# Patient Record
Sex: MALE | Race: OTHER | NOT HISPANIC OR LATINO | URBAN - METROPOLITAN AREA
[De-identification: names, ages, dates, MRNs, and addresses within clinical notes are randomized per-mention and may not be internally consistent; named-entity substitution may affect disease eponyms.]

---

## 2018-11-01 ENCOUNTER — OUTPATIENT (OUTPATIENT)
Dept: OUTPATIENT SERVICES | Facility: HOSPITAL | Age: 68
LOS: 1 days | End: 2018-11-01
Payer: MEDICARE

## 2018-11-01 PROCEDURE — G9001: CPT

## 2018-11-10 PROBLEM — Z00.00 ENCOUNTER FOR PREVENTIVE HEALTH EXAMINATION: Status: ACTIVE | Noted: 2018-11-10

## 2018-11-19 ENCOUNTER — TRANSCRIPTION ENCOUNTER (OUTPATIENT)
Age: 68
End: 2018-11-19

## 2018-11-19 NOTE — ASU PATIENT PROFILE, ADULT - PMH
Anemia    Diverticulitis    DM (diabetes mellitus)  type 11  HTN (hypertension)    Hyperlipidemia    Obesity    Osteoarthritis    Vitamin D deficiency

## 2018-11-19 NOTE — ASU PATIENT PROFILE, ADULT - VISION (WITH CORRECTIVE LENSES IF THE PATIENT USUALLY WEARS THEM):
left eye blurry/Partially impaired: cannot see medication labels or newsprint, but can see obstacles in path, and the surrounding layout; can count fingers at arm's length

## 2018-11-20 ENCOUNTER — OUTPATIENT (OUTPATIENT)
Dept: OUTPATIENT SERVICES | Facility: HOSPITAL | Age: 68
LOS: 1 days | End: 2018-11-20
Payer: MEDICARE

## 2018-11-20 VITALS
OXYGEN SATURATION: 98 % | RESPIRATION RATE: 22 BRPM | HEART RATE: 81 BPM | TEMPERATURE: 99 F | WEIGHT: 207.23 LBS | HEIGHT: 65.5 IN

## 2018-11-20 VITALS
DIASTOLIC BLOOD PRESSURE: 71 MMHG | OXYGEN SATURATION: 98 % | RESPIRATION RATE: 23 BRPM | HEART RATE: 80 BPM | SYSTOLIC BLOOD PRESSURE: 134 MMHG

## 2018-11-20 DIAGNOSIS — Z98.890 OTHER SPECIFIED POSTPROCEDURAL STATES: Chronic | ICD-10-CM

## 2018-11-20 DIAGNOSIS — H25.812 COMBINED FORMS OF AGE-RELATED CATARACT, LEFT EYE: ICD-10-CM

## 2018-11-20 LAB — GLUCOSE BLDC GLUCOMTR-MCNC: 123 MG/DL — HIGH (ref 70–99)

## 2018-11-20 PROCEDURE — 66984 XCAPSL CTRC RMVL W/O ECP: CPT | Mod: LT

## 2018-11-20 PROCEDURE — V2632: CPT

## 2018-11-20 PROCEDURE — 82962 GLUCOSE BLOOD TEST: CPT

## 2018-11-23 ENCOUNTER — EMERGENCY (EMERGENCY)
Facility: HOSPITAL | Age: 68
LOS: 1 days | Discharge: HOME | End: 2018-11-23
Attending: EMERGENCY MEDICINE | Admitting: EMERGENCY MEDICINE
Payer: MEDICARE

## 2018-11-23 VITALS
OXYGEN SATURATION: 96 % | DIASTOLIC BLOOD PRESSURE: 69 MMHG | HEART RATE: 98 BPM | WEIGHT: 205.03 LBS | HEIGHT: 63 IN | TEMPERATURE: 98 F | SYSTOLIC BLOOD PRESSURE: 148 MMHG | RESPIRATION RATE: 18 BRPM

## 2018-11-23 VITALS
SYSTOLIC BLOOD PRESSURE: 132 MMHG | OXYGEN SATURATION: 97 % | TEMPERATURE: 98 F | DIASTOLIC BLOOD PRESSURE: 68 MMHG | HEART RATE: 88 BPM | RESPIRATION RATE: 14 BRPM

## 2018-11-23 DIAGNOSIS — Z98.890 OTHER SPECIFIED POSTPROCEDURAL STATES: Chronic | ICD-10-CM

## 2018-11-23 PROBLEM — K57.92 DIVERTICULITIS OF INTESTINE, PART UNSPECIFIED, WITHOUT PERFORATION OR ABSCESS WITHOUT BLEEDING: Chronic | Status: ACTIVE | Noted: 2018-11-20

## 2018-11-23 PROBLEM — I10 ESSENTIAL (PRIMARY) HYPERTENSION: Chronic | Status: ACTIVE | Noted: 2018-11-20

## 2018-11-23 PROBLEM — M19.90 UNSPECIFIED OSTEOARTHRITIS, UNSPECIFIED SITE: Chronic | Status: ACTIVE | Noted: 2018-11-20

## 2018-11-23 PROBLEM — E78.5 HYPERLIPIDEMIA, UNSPECIFIED: Chronic | Status: ACTIVE | Noted: 2018-11-20

## 2018-11-23 PROBLEM — E55.9 VITAMIN D DEFICIENCY, UNSPECIFIED: Chronic | Status: ACTIVE | Noted: 2018-11-20

## 2018-11-23 PROBLEM — D64.9 ANEMIA, UNSPECIFIED: Chronic | Status: ACTIVE | Noted: 2018-11-20

## 2018-11-23 PROBLEM — E11.9 TYPE 2 DIABETES MELLITUS WITHOUT COMPLICATIONS: Chronic | Status: ACTIVE | Noted: 2018-11-20

## 2018-11-23 PROBLEM — E66.9 OBESITY, UNSPECIFIED: Chronic | Status: ACTIVE | Noted: 2018-11-20

## 2018-11-23 PROCEDURE — 99284 EMERGENCY DEPT VISIT MOD MDM: CPT

## 2018-11-23 RX ORDER — INSULIN LISPRO 100 [IU]/ML
100 INJECTION, SUSPENSION SUBCUTANEOUS
Qty: 0 | Refills: 0 | COMMUNITY

## 2018-11-23 RX ORDER — OFLOXACIN 0.3 %
1 DROPS OPHTHALMIC (EYE)
Qty: 0 | Refills: 0 | COMMUNITY

## 2018-11-23 RX ORDER — LOSARTAN POTASSIUM 100 MG/1
1 TABLET, FILM COATED ORAL
Qty: 0 | Refills: 0 | COMMUNITY

## 2018-11-23 RX ORDER — POLYMYXIN B SULF/TRIMETHOPRIM 10000-1/ML
1 DROPS OPHTHALMIC (EYE) ONCE
Qty: 0 | Refills: 0 | Status: COMPLETED | OUTPATIENT
Start: 2018-11-23 | End: 2018-11-23

## 2018-11-23 RX ORDER — DIPHENHYDRAMINE HCL 50 MG
25 CAPSULE ORAL EVERY 6 HOURS
Qty: 0 | Refills: 0 | Status: DISCONTINUED | OUTPATIENT
Start: 2018-11-23 | End: 2018-11-27

## 2018-11-23 RX ORDER — BROMFENAC 0.76 MG/ML
1 SOLUTION/ DROPS OPHTHALMIC
Qty: 0 | Refills: 0 | COMMUNITY

## 2018-11-23 RX ORDER — LINAGLIPTIN AND METFORMIN HYDROCHLORIDE 2.5; 85 MG/1; MG/1
1 TABLET, FILM COATED ORAL
Qty: 0 | Refills: 0 | COMMUNITY

## 2018-11-23 RX ORDER — SIMVASTATIN 20 MG/1
1 TABLET, FILM COATED ORAL
Qty: 0 | Refills: 0 | COMMUNITY

## 2018-11-23 RX ORDER — TROPICAMIDE 1 %
1 DROPS OPHTHALMIC (EYE)
Qty: 0 | Refills: 0 | COMMUNITY

## 2018-11-23 RX ORDER — PREDNISOLONE SODIUM PHOSPHATE 1 %
1 DROPS OPHTHALMIC (EYE)
Qty: 0 | Refills: 0 | COMMUNITY

## 2018-11-23 RX ADMIN — Medication 25 MILLIGRAM(S): at 11:35

## 2018-11-23 RX ADMIN — Medication 1 DROP(S): at 12:00

## 2018-11-23 NOTE — ED PROVIDER NOTE - PROGRESS NOTE DETAILS
Seen by Opth resident who discussed with Ophth attending. DC with Polytrim and prednisolone only. FU office on Monday.

## 2018-11-23 NOTE — ED ADULT NURSE NOTE - CHIEF COMPLAINT QUOTE
" Scratchy, swelling and redness to left eye since yesterday, s/p left cataract surgey by Dr. Larson here at [place] on Tuesday, right eye 20/100, left eye 20/40 "

## 2018-11-23 NOTE — ED PROVIDER NOTE - CHPI ED SYMPTOMS NEG
no foreign body/no blurred vision/no drainage/no double vision/no discharge/no photophobia/no purulent drainage/no pain

## 2018-11-23 NOTE — ED ADULT TRIAGE NOTE - CHIEF COMPLAINT QUOTE
" Scratchy, swelling and redness to left eye since yesterday, s/p left cataract surgey by Dr. Larson here at [place] on Tuesday " " Scratchy, swelling and redness to left eye since yesterday, s/p left cataract surgey by Dr. Larson here at [place] on Tuesday, right eye 20/100, left eye 20/40 "

## 2018-11-23 NOTE — ED PROVIDER NOTE - OBJECTIVE STATEMENT
67 yo male with hx of left cataract surgery in [facility] on 11/20 Dr Garcia. Seen in office for follow up 11/21 and doing well. Today developed redness swelling and itching of left eye. Has been using multiple eye drops since day after surgery. Denies fever, Pain, vision loss or other symptom. Tried to call ophth office today but got no reply.

## 2018-11-23 NOTE — ED PROVIDER NOTE - MEDICAL DECISION MAKING DETAILS
69 yo male sp cataract surgery with probable allergic reaction to one of eye drops he has been using. Plan - ophth consult.

## 2018-11-23 NOTE — ED PROVIDER NOTE - EYE, LEFT
CHEMOSIS/Periorbital redness and swelling, ?allergic reaction/pupils equal, round, and reactive to light/CONJUNCTIVA ERYTHEMA

## 2018-11-27 DIAGNOSIS — Z71.89 OTHER SPECIFIED COUNSELING: ICD-10-CM

## 2020-02-20 DIAGNOSIS — G89.29 PAIN IN RIGHT KNEE: ICD-10-CM

## 2020-02-20 DIAGNOSIS — M25.561 PAIN IN RIGHT KNEE: ICD-10-CM

## 2020-02-24 ENCOUNTER — APPOINTMENT (OUTPATIENT)
Dept: ORTHOPEDIC SURGERY | Facility: CLINIC | Age: 70
End: 2020-02-24
Payer: MEDICARE

## 2020-02-24 VITALS
BODY MASS INDEX: 35.32 KG/M2 | WEIGHT: 212 LBS | HEIGHT: 65 IN | HEART RATE: 76 BPM | DIASTOLIC BLOOD PRESSURE: 73 MMHG | SYSTOLIC BLOOD PRESSURE: 146 MMHG

## 2020-02-24 DIAGNOSIS — Z72.89 OTHER PROBLEMS RELATED TO LIFESTYLE: ICD-10-CM

## 2020-02-24 DIAGNOSIS — Z78.9 OTHER SPECIFIED HEALTH STATUS: ICD-10-CM

## 2020-02-24 DIAGNOSIS — Z87.39 PERSONAL HISTORY OF OTHER DISEASES OF THE MUSCULOSKELETAL SYSTEM AND CONNECTIVE TISSUE: ICD-10-CM

## 2020-02-24 PROCEDURE — 73564 X-RAY EXAM KNEE 4 OR MORE: CPT | Mod: 50

## 2020-02-24 PROCEDURE — 99204 OFFICE O/P NEW MOD 45 MIN: CPT

## 2020-02-24 RX ORDER — LOSARTAN POTASSIUM 100 MG/1
TABLET, FILM COATED ORAL
Refills: 0 | Status: ACTIVE | COMMUNITY

## 2020-02-24 RX ORDER — SIMVASTATIN 80 MG/1
TABLET, FILM COATED ORAL
Refills: 0 | Status: ACTIVE | COMMUNITY

## 2020-02-24 NOTE — REASON FOR VISIT
[Initial Visit] : an initial visit for [Osteoarthritis, Knee] : osteoarthritis, knee [Family Member] : family member

## 2020-02-24 NOTE — DISCUSSION/SUMMARY
[de-identified] : This patient has severe bilateral knee osteoarthritis.  Given that the right knee is more painful he would like to start with a right total knee arthroplasty having failed a course of conservative management and he would like to do this with robotic navigation for assistance.  After allowing a minimum of 3 months for healing he would then like to proceed with a staged left total knee arthroplasty.\par \par The patient is an appropriate candidate for consideration of right total knee replacement. An extensive discussion was conducted of the natural history of the disease and the variety of surgical and non-surgical treatment options available to the patient. A risk/benefit analysis was discussed with the patient reviewing the advantages and disadvantages of surgical intervention at this time. Both the level and length of the patient's pain have made additional conservative treatment measures consisting of physical therapy, corticosteroids, and/or viscosupplementation contraindicated. A full explanation was given of the nature and the purpose of the procedure and anesthesia, its benefits, possible alternative methods of diagnosis or treatment, the risks involved, the possibility of complications, the foreseeable consequences of the procedure and the possible results of the non-treatment. I reviewed the plan of care as well as used a model of a total knee implant equivalent to the one that will be used for their total knee joint replacement. The ability to secure the implant utilizing cement or cementless (press-fit) was discussed with the patient. The patient agrees with the plan of care, as well as the use of implants for their total knee replacement.   We also discussed that if robotic/computer navigation is utilized, then additional incisions will be need to be made to accommodate the computer navigation arrays, which will be placed in the femur and tibia.\par \par No guarantee or assurance was made as to the results that may be obtained. Specifically, the risks were identified to include, but are not limited to the following: Infection, phlebitis, pulmonary embolism, death, paralysis, dislocation, pain, stiffness, instability, limp, weakness, breakage, leg-length inequality, uncontrolled bleeding, nerve injury, blood vessel injury, pressure sores, anesthetic risks, delayed healing of wound and bone, and wear and loosening. Further discussion was undertaken with the patient about the details of surgical preparation, treatment, and postoperative rehabilitation including medical clearance, autotransfusion, the hospital course, and the postoperative rehabilitation involved. All in all, I feel that this patient is a good candidate for surgical reconstruction.

## 2020-02-24 NOTE — PHYSICAL EXAM
[de-identified] : Patient is well nourished, well-developed, in no acute distress, with appropriate mood and affect. The patient is oriented to time, place, and person. Respirations are even and unlabored. Gait evaluation does reveal a limp. There is no inguinal adenopathy. Bilateral limbs are well-perfused, without skin lesions, shows a grossly normal motor and sensory examination. The right knee motion is significantly reduced and does cause significant pain. The right knee moves from 0-125 degrees. The knee is stable within that range-of-motion to AP and ML stress. The alignment of the knee is 10 degrees varus. Muscle strength is normal. Pedal pulses are palpable. Hip examination was negative. The left knee motion is significantly reduced and does cause significant pain. The left knee moves from 0-125 degrees. The knee is stable within that range-of-motion to AP and ML stress. The alignment of the knee is 10 degrees varus. Muscle strength is normal. Pedal pulses are palpable. Hip examination was negative. [de-identified] : Long standing knee, AP knee, lateral knee, and patellar views of the bilateral knee were ordered and taken in the office and demonstrate severe degenerative joint disease of the knee with joint space narrowing, osteophyte formation, and subchondral sclerosis.

## 2020-02-24 NOTE — HISTORY OF PRESENT ILLNESS
[de-identified] : This is very nice 69-year-old gentleman experiencing bilateral knee pain, which is severe in intensity. The pain substantially limits activities of daily living. Walking tolerance is reduced. Medication including NSAIDs and activity modification have been minimally effective for a period lasting greater than three months in duration. Assistive devices and external support were not deemed by the patient to be helpful in improving their function.  He is already tried a course of physical therapy in the past which has helped but due to the severity of osteoarthritis and level of pain, physical therapy is contraindicated. Pain and restriction of function are intolerable at this time.  The patient denies any radiation of the pain to the feet and it is not associated with numbness, tingling, or weakness.

## 2020-02-26 ENCOUNTER — APPOINTMENT (OUTPATIENT)
Dept: ORTHOPEDIC SURGERY | Facility: CLINIC | Age: 70
End: 2020-02-26

## 2020-03-24 ENCOUNTER — APPOINTMENT (OUTPATIENT)
Dept: CT IMAGING | Facility: CLINIC | Age: 70
End: 2020-03-24

## 2020-04-03 ENCOUNTER — APPOINTMENT (OUTPATIENT)
Dept: ORTHOPEDIC SURGERY | Facility: HOSPITAL | Age: 70
End: 2020-04-03

## 2020-12-23 ENCOUNTER — FORM ENCOUNTER (OUTPATIENT)
Age: 70
End: 2020-12-23

## 2020-12-24 ENCOUNTER — TRANSCRIPTION ENCOUNTER (OUTPATIENT)
Age: 70
End: 2020-12-24

## 2020-12-28 ENCOUNTER — APPOINTMENT (OUTPATIENT)
Dept: DISASTER EMERGENCY | Facility: HOSPITAL | Age: 70
End: 2020-12-28

## 2020-12-29 ENCOUNTER — OUTPATIENT (OUTPATIENT)
Dept: OUTPATIENT SERVICES | Facility: HOSPITAL | Age: 70
LOS: 1 days | End: 2020-12-29
Payer: MEDICARE

## 2020-12-29 ENCOUNTER — APPOINTMENT (OUTPATIENT)
Dept: DISASTER EMERGENCY | Facility: HOSPITAL | Age: 70
End: 2020-12-29

## 2020-12-29 VITALS
DIASTOLIC BLOOD PRESSURE: 83 MMHG | OXYGEN SATURATION: 97 % | SYSTOLIC BLOOD PRESSURE: 148 MMHG | RESPIRATION RATE: 18 BRPM | HEART RATE: 82 BPM | HEIGHT: 73 IN | TEMPERATURE: 98 F | WEIGHT: 195.11 LBS

## 2020-12-29 VITALS
OXYGEN SATURATION: 96 % | TEMPERATURE: 99 F | SYSTOLIC BLOOD PRESSURE: 133 MMHG | DIASTOLIC BLOOD PRESSURE: 84 MMHG | RESPIRATION RATE: 18 BRPM | HEART RATE: 81 BPM

## 2020-12-29 DIAGNOSIS — U07.1 COVID-19: ICD-10-CM

## 2020-12-29 DIAGNOSIS — Z98.890 OTHER SPECIFIED POSTPROCEDURAL STATES: Chronic | ICD-10-CM

## 2020-12-29 PROCEDURE — M0239: CPT

## 2020-12-29 RX ORDER — BAMLANIVIMAB 35 MG/ML
700 INJECTION, SOLUTION INTRAVENOUS ONCE
Refills: 0 | Status: COMPLETED | OUTPATIENT
Start: 2020-12-29 | End: 2020-12-29

## 2020-12-29 RX ADMIN — BAMLANIVIMAB 200 MILLIGRAM(S): 35 INJECTION, SOLUTION INTRAVENOUS at 09:30

## 2020-12-29 NOTE — MONOCLONAL ANTIBODY INFUSION - ASSESSMENT AND PLAN
PMHx:  PSHx:    CC: Monoclonal Antibody Infusion/COVID 19 Positive  70yMale    Patient is covid + and presents today for monoclonoal antibody infusion.  Pt met criteria for the infusion and is medically cleared for infusion today.      exam/findings:  T(C): --  HR: --  BP: --  RR: --  SpO2: --      PE:   Appearance: NAD	  HEENT:   No Lymphadenopathy  Cardiovascular: RRR  Respiratory: CTA B/L  Gastrointestinal:  Soft, Non-tender  Skin: warm and dry, no rash, no lesions  Extremities: Neg edema    ASSESSMENT:  Patient is a 71yo with a past medical history of ---- found with + Covid PCR( date)  referred by who presents to infusion center for Monoclonal antibody infusion (Bamlanivimab)    Risk Profile includes:     PLAN:  - Infusion procedure explained to patient   - Consent for monoclonal antibody infusion obtained and placed in patient's bedside chart  - Risk and benefits discussed with the patient and all questions were answered  - Infuse Bamlanivimab 700mg IV over one hour  - Check vital signs immediately prior to infusion start and then after 15 minutes, 30 minutes, and at completion of infusion.   - Monitor the patient for one hour post infusion and then check vitals again prior to discharge.    Roma Yeager PA-C    Addendum @    Vitals:    Patient is cleared for discharge. He/she tolerated full infusion well and denies complaints of chest pain, shortness of breath, nausea/vomiting, dizziness, or palpitations. Vital signs stable upon discharge. Patient is medically stable and cleared for discharge home. Discharge instructions provided to patient with fact sheet included. Patient was instructed to continue to self-isolate and use  infection control measures (e.g., wear mask, isolate, social distance, avoid sharing personal items, clean and disinfect “high touch” surfaces, and frequent handwashing) according to CDC guidelines. Patient instructed to follow up with PMD as needed.    Roma Yeager PA-C       PMHx:  PSHx:    CC: Monoclonal Antibody Infusion/COVID 19 Positive  70yMale    Patient is covid + and presents today for monoclonoal antibody infusion.  Pt met criteria for the infusion and is medically cleared for infusion today.      exam/findings:  T(C): --  HR: --  BP: --  RR: --  SpO2: --      PE:   Appearance: NAD	  HEENT:   No Lymphadenopathy  Cardiovascular: RRR  Respiratory: CTA B/L  Gastrointestinal:  Soft, Non-tender  Skin: warm and dry, no rash, no lesions  Extremities: Neg edema    ASSESSMENT:  Patient is a 69yo with a past medical history of ---- found with + Covid PCR(12/21)  referred by Dr. Pierce who presents to infusion center for Monoclonal antibody infusion (Bamlanivimab)    Risk Profile includes:     PLAN:  - Infusion procedure explained to patient   - Consent for monoclonal antibody infusion obtained and placed in patient's bedside chart  - Risk and benefits discussed with the patient and all questions were answered  - Infuse Bamlanivimab 700mg IV over one hour  - Check vital signs immediately prior to infusion start and then after 15 minutes, 30 minutes, and at completion of infusion.   - Monitor the patient for one hour post infusion and then check vitals again prior to discharge.    Roma Yeager PA-C    Addendum @    Vitals:    Patient is cleared for discharge. He/she tolerated full infusion well and denies complaints of chest pain, shortness of breath, nausea/vomiting, dizziness, or palpitations. Vital signs stable upon discharge. Patient is medically stable and cleared for discharge home. Discharge instructions provided to patient with fact sheet included. Patient was instructed to continue to self-isolate and use  infection control measures (e.g., wear mask, isolate, social distance, avoid sharing personal items, clean and disinfect “high touch” surfaces, and frequent handwashing) according to CDC guidelines. Patient instructed to follow up with PMD as needed.    Roma Yeager PA-C       PAST MEDICAL & SURGICAL HISTORY:  Vitamin D deficiency    Osteoarthritis    Anemia    Diverticulitis    Obesity    Hyperlipidemia    HTN (hypertension)    DM (diabetes mellitus)  type 11    S/P colonoscopy          CC: Monoclonal Antibody Infusion/COVID 19 Positive  70yMale    Patient is covid + and presents today for monoclonoal antibody infusion.  Pt met criteria for the infusion and is medically cleared for infusion today.      exam/findings:  T(C): 37.3 (12-29-20 @ 09:24), Max: 37.3 (12-29-20 @ 09:24)  HR: 82 (12-29-20 @ 09:24) (82 - 82)  BP: 131/75 (12-29-20 @ 09:24) (131/75 - 148/83)  RR: 18 (12-29-20 @ 09:24) (18 - 18)  SpO2: 96% (12-29-20 @ 09:24) (96% - 97%)      PE:   Appearance: NAD	  HEENT:   No Lymphadenopathy  Cardiovascular: RRR  Respiratory: CTA B/L  Gastrointestinal:  Soft, Non-tender  Skin: warm and dry, no rash, no lesions  Extremities: Neg edema    ASSESSMENT:  Patient is a 71yo with a past medical history of CHTN, DM , high cholesterolfound with + Covid PCR(12/21)  referred by Dr. Pierce who presents to infusion center for Monoclonal antibody infusion (Bamlanivimab)    Risk Profile includes: >66yo, DM    PLAN:  - Infusion procedure explained to patient   - Consent for monoclonal antibody infusion obtained and placed in patient's bedside chart  - Risk and benefits discussed with the patient and all questions were answered  - Infuse Bamlanivimab 700mg IV over one hour  - Check vital signs immediately prior to infusion start and then after 15 minutes, 30 minutes, and at completion of infusion.   - Monitor the patient for one hour post infusion and then check vitals again prior to discharge.    Roma Yeager PA-C    Addendum @    Vitals:    Patient is cleared for discharge. He/she tolerated full infusion well and denies complaints of chest pain, shortness of breath, nausea/vomiting, dizziness, or palpitations. Vital signs stable upon discharge. Patient is medically stable and cleared for discharge home. Discharge instructions provided to patient with fact sheet included. Patient was instructed to continue to self-isolate and use  infection control measures (e.g., wear mask, isolate, social distance, avoid sharing personal items, clean and disinfect “high touch” surfaces, and frequent handwashing) according to CDC guidelines. Patient instructed to follow up with PMD as needed.    Roma Yeager PA-C       PAST MEDICAL & SURGICAL HISTORY:  Vitamin D deficiency    Osteoarthritis    Anemia    Diverticulitis    Obesity    Hyperlipidemia    HTN (hypertension)    DM (diabetes mellitus)  type 11    S/P colonoscopy          CC: Monoclonal Antibody Infusion/COVID 19 Positive  70yMale    Patient is covid + and presents today for monoclonoal antibody infusion.  Pt met criteria for the infusion and is medically cleared for infusion today.      exam/findings:  T(C): 37.3 (12-29-20 @ 09:24), Max: 37.3 (12-29-20 @ 09:24)  HR: 82 (12-29-20 @ 09:24) (82 - 82)  BP: 131/75 (12-29-20 @ 09:24) (131/75 - 148/83)  RR: 18 (12-29-20 @ 09:24) (18 - 18)  SpO2: 96% (12-29-20 @ 09:24) (96% - 97%)      PE:   Appearance: NAD	  HEENT:   No Lymphadenopathy  Cardiovascular: RRR  Respiratory: CTA B/L  Gastrointestinal:  Soft, Non-tender  Skin: warm and dry, no rash, no lesions  Extremities: Neg edema    ASSESSMENT:  Patient is a 71yo with a past medical history of CHTN, DM , high cholesterolfound with + Covid PCR(12/21)  referred by Dr. Pierce who presents to infusion center for Monoclonal antibody infusion (Bamlanivimab)    Risk Profile includes: >64yo, DM    PLAN:  - Infusion procedure explained to patient   - Consent for monoclonal antibody infusion obtained and placed in patient's bedside chart  - Risk and benefits discussed with the patient and all questions were answered  - Infuse Bamlanivimab 700mg IV over one hour  - Check vital signs immediately prior to infusion start and then after 15 minutes, 30 minutes, and at completion of infusion.   - Monitor the patient for one hour post infusion and then check vitals again prior to discharge.    Roma Yeager PA-C    Addendum @    Vitals:    Patient is cleared for discharge. He/she tolerated full infusion well and denies complaints of chest pain, shortness of breath, nausea/vomiting, dizziness, or palpitations. Vital signs stable upon discharge. Patient is medically stable and cleared for discharge home. Discharge instructions provided to patient with fact sheet included. Patient was instructed to continue to self-isolate and use  infection control measures (e.g., wear mask, isolate, social distance, avoid sharing personal items, clean and disinfect “high touch” surfaces, and frequent handwashing) according to CDC guidelines. Patient instructed to follow up with PMD as needed.    Roma Yeager PA-C      Pts son interpreted via telephone bc unable to obtain proper languange through the  ruby - Jose J Abad [phone] PAST MEDICAL & SURGICAL HISTORY:  Vitamin D deficiency    Osteoarthritis    Anemia    Diverticulitis    Obesity    Hyperlipidemia    HTN (hypertension)    DM (diabetes mellitus)  type 11    S/P colonoscopy          CC: Monoclonal Antibody Infusion/COVID 19 Positive  70yMale    Patient is covid + and presents today for monoclonoal antibody infusion.  Pt met criteria for the infusion and is medically cleared for infusion today.      exam/findings:  T(C): 37.3 (12-29-20 @ 09:24), Max: 37.3 (12-29-20 @ 09:24)  HR: 82 (12-29-20 @ 09:24) (82 - 82)  BP: 131/75 (12-29-20 @ 09:24) (131/75 - 148/83)  RR: 18 (12-29-20 @ 09:24) (18 - 18)  SpO2: 96% (12-29-20 @ 09:24) (96% - 97%)      PE:   Appearance: NAD	  HEENT:   No Lymphadenopathy  Cardiovascular: RRR  Respiratory: CTA B/L  Gastrointestinal:  Soft, Non-tender  Skin: warm and dry, no rash, no lesions  Extremities: Neg edema    ASSESSMENT:  Patient is a 69yo with a past medical history of CHTN, DM , high cholesterolfound with + Covid PCR(12/21)  referred by Dr. Pierce who presents to infusion center for Monoclonal antibody infusion (Bamlanivimab)    Risk Profile includes: >64yo, DM    PLAN:  - Infusion procedure explained to patient   - Consent for monoclonal antibody infusion obtained and placed in patient's bedside chart  - Risk and benefits discussed with the patient and all questions were answered  - Infuse Bamlanivimab 700mg IV over one hour  - Check vital signs immediately prior to infusion start and then after 15 minutes, 30 minutes, and at completion of infusion.   - Monitor the patient for one hour post infusion and then check vitals again prior to discharge.    Roma Yeager PA-C    Addendum @1250p    Vitals:  T(C): 37 (12-29-20 @ 11:25), Max: 37.4 (12-29-20 @ 09:45)  HR: 81 (12-29-20 @ 11:25) (79 - 87)  BP: 133/84 (12-29-20 @ 11:25) (122/76 - 148/83)  RR: 18 (12-29-20 @ 11:25) (18 - 18)  SpO2: 96% (12-29-20 @ 11:25) (96% - 99%)  Patient is cleared for discharge. He/she tolerated full infusion well and denies complaints of chest pain, shortness of breath, nausea/vomiting, dizziness, or palpitations. Vital signs stable upon discharge. Patient is medically stable and cleared for discharge home. Discharge instructions provided to patient with fact sheet included. Patient was instructed to continue to self-isolate and use  infection control measures (e.g., wear mask, isolate, social distance, avoid sharing personal items, clean and disinfect “high touch” surfaces, and frequent handwashing) according to CDC guidelines. Patient instructed to follow up with PMD as needed.    Roma Yeager PA-C      Pts son interpreted via telephone bc unable to obtain proper languange through the  line - Jose J Abad [phone]

## 2020-12-29 NOTE — MONOCLONAL ANTIBODY INFUSION - HOME MEDICATIONS
BromSite 0.075% ophthalmic solution , 1 drop(s) to each affected eye 2 times a day  prednisoLONE acetate 1% ophthalmic suspension , 1 drop(s) to each affected eye 4 times a day  ofloxacin 0.3% ophthalmic solution , 1 drop(s) to each affected eye 4 times a day  simvastatin 20 mg oral tablet , 1 tab(s) orally once a day (at bedtime)  losartan 100 mg oral tablet , 1 tab(s) orally once a day  Jentadueto 2.5 mg-1000 mg oral tablet , 1 tab(s) orally 2 times a day  HumaLOG Mix 75/25 subcutaneous suspension , 100 unit(s) subcutaneous 2 times a day

## 2021-03-03 NOTE — ASU PREOP CHECKLIST - NS PREOP CHK HIBICLENS NA
N/A Epidermal Autograft Text: The defect edges were debeveled with a #15 scalpel blade.  Given the location of the defect, shape of the defect and the proximity to free margins an epidermal autograft was deemed most appropriate.  Using a sterile surgical marker, the primary defect shape was transferred to the donor site. The epidermal graft was then harvested.  The skin graft was then placed in the primary defect and oriented appropriately.

## 2021-11-09 NOTE — ASU DISCHARGE PLAN (ADULT/PEDIATRIC). - ACCOMPANYING ADULT'S SIGNATURE_______________________________________
Statement Selected Procedure To Be Performed At Next Visit: Shave Removal Detail Level: Detailed Introduction Text (Please End With A Colon): The following procedure was deferred: shave biopsy Procedure To Be Performed At Next Visit: Biopsy by shave method

## 2023-10-17 ENCOUNTER — APPOINTMENT (OUTPATIENT)
Dept: ORTHOPEDIC SURGERY | Facility: CLINIC | Age: 73
End: 2023-10-17

## 2023-11-22 ENCOUNTER — APPOINTMENT (OUTPATIENT)
Dept: ORTHOPEDIC SURGERY | Facility: CLINIC | Age: 73
End: 2023-11-22
Payer: MEDICARE

## 2023-11-22 VITALS — WEIGHT: 212 LBS | BODY MASS INDEX: 35.32 KG/M2 | HEIGHT: 65 IN

## 2023-11-22 PROCEDURE — 99204 OFFICE O/P NEW MOD 45 MIN: CPT | Mod: 25

## 2023-11-22 PROCEDURE — 73564 X-RAY EXAM KNEE 4 OR MORE: CPT | Mod: 50

## 2023-11-22 PROCEDURE — 20610 DRAIN/INJ JOINT/BURSA W/O US: CPT | Mod: 50

## 2023-12-28 NOTE — ED ADULT NURSE NOTE - CCCP TRG CHIEF CMPLNT
"Six Minute Walk Test:    Room air at rest: SPO2 94% HR 84  Room air: pt walked 600 ft/ 183 m in 4:00. SPO2 85%, . Walk stopped. placed pt on 2L continuous oxygen for 5 min.    Six Minute Walk Test:  Probe: (Finger) Stops: (0 )   Patient walked (900 Ft / 274m) in 6 minutes.  LLN = (1130 Ft /345 m)   Oxygen during the test (YES) LPM = (2 ) (Continuous)  O2 system: (Pushing walker with \"E\" cylinder)  Pre-walk: 02 Saturation (98%) Heart Rate (86 ) Maria M Dyspnea = ( 0) Fatigue = (0 )   Post-walk: 02 Saturation (93%) Heart Rate ( 104) Maria M Dyspnea = (3 ) Fatigue = ( 0)   Lowest 02 saturation during the 6 minute walk (93 %)  Max heart rate during walk (104 )  Recovery time to baseline 02 SAT (0:42) minutes and HR (0:34) minutes.  Patient reports walk distance may have been affected by (none noted )  " eye redness

## 2024-01-03 ENCOUNTER — APPOINTMENT (OUTPATIENT)
Dept: ORTHOPEDIC SURGERY | Facility: CLINIC | Age: 74
End: 2024-01-03

## 2024-05-01 ENCOUNTER — APPOINTMENT (OUTPATIENT)
Dept: ORTHOPEDIC SURGERY | Facility: CLINIC | Age: 74
End: 2024-05-01
Payer: MEDICARE

## 2024-05-01 DIAGNOSIS — M17.12 UNILATERAL PRIMARY OSTEOARTHRITIS, LEFT KNEE: ICD-10-CM

## 2024-05-01 DIAGNOSIS — M17.11 UNILATERAL PRIMARY OSTEOARTHRITIS, RIGHT KNEE: ICD-10-CM

## 2024-05-01 PROCEDURE — 73564 X-RAY EXAM KNEE 4 OR MORE: CPT | Mod: 50

## 2024-05-01 PROCEDURE — 99214 OFFICE O/P EST MOD 30 MIN: CPT | Mod: 25

## 2024-05-01 PROCEDURE — 20610 DRAIN/INJ JOINT/BURSA W/O US: CPT | Mod: 50

## 2024-05-01 NOTE — ASSESSMENT
[FreeTextEntry1] : 73M with bilateral knee OA  Discussed anti-inflammatories, PT/HEP, CSI, visco injections, and briefly TKA. He is well informed and would like to proceed with bilateral knee CSIs today, tolerated well.   The risks, benefits, contents and alternatives to injection were explained in full to the patient. Risks outlined include but are not limited to infection, sepsis, bleeding, scarring, skin discoloration, temporary increase in pain, syncopal episode, failure to resolve symptoms, allergic reaction, flare reaction, permanent white skin discoloration, symptom recurrence, and elevation of blood sugar in diabetics. Patient understood the risks. All questions were answered. After discussion of options, patient requested an injection. Oral informed consent was obtained and sterile prep was done of the injection site. Sterile technique was used to introduce the mixture. Patient tolerated the procedure well. Patient advised to ice the injection site this evening. Signs and symptoms of infection reviewed and patient advised to call immediately for redness, fevers, and/or chills.

## 2024-05-01 NOTE — HISTORY OF PRESENT ILLNESS
[9] : 9 [Dull/Aching] : dull/aching [Throbbing] : throbbing [Constant] : constant [Meds] : meds [de-identified] : 11/22/23: 72yo M with longstanding bilateral L>R knee pain that has been gradually worsening over the past few months with no injury. Admits to increasing pain and difficulty with prolonged walking/standing, startup, and stairs. No prior injections. He was originally scheduled to have R TKA with Dr. Oswald in 2020 but had been cancelled due to the pandemic.   5/1/24: pt is here today for follow up on L knee/BL knee. pt states after having a csi eber knees started to give him some pain. The pain returned 2 weeks ago, causing pain, ambulating with a limp [] : no [FreeTextEntry5] : Patient is here for LEFT knee pain. No prior injury. Pain started six months ago. Feels n/t. Hears cracking/ Clicking. Occasionally gayle.  [FreeTextEntry9] : Tylenol/ Motrin

## 2024-05-01 NOTE — PROCEDURE
[Large Joint Injection] : Large joint injection [Bilateral] : bilaterally of the [Knee] : knee [Pain] : pain [Inflammation] : inflammation [X-ray evidence of Osteoarthritis on this or prior visit] : x-ray evidence of Osteoarthritis on this or prior visit [Alcohol] : alcohol [Betadine] : betadine [Ethyl Chloride sprayed topically] : ethyl chloride sprayed topically [Sterile technique used] : sterile technique used [___ cc    0.25%] : Bupivacaine (Marcaine) ~Vcc of 0.25%  [___ cc    40mg] : Triamcinolone (Kenalog) ~Vcc of 40 mg  [] : Patient tolerated procedure well [Call if redness, pain or fever occur] : call if redness, pain or fever occur [Apply ice for 15min out of every hour for the next 12-24 hours as tolerated] : apply ice for 15 minutes out of every hour for the next 12-24 hours as tolerated [Patient was advised to rest the joint(s) for ____ days] : patient was advised to rest the joint(s) for [unfilled] days [Previous OTC use and PT nontherapeutic] : patient has tried OTC's including aspirin, Ibuprofen, Aleve, etc or prescription NSAIDS, and/or exercises at home and/or physical therapy without satisfactory response [Risks, benefits, alternatives discussed / Verbal consent obtained] : the risks benefits, and alternatives have been discussed, and verbal consent was obtained

## 2024-05-01 NOTE — PHYSICAL EXAM
[5___] : hamstring 5[unfilled]/5 [Bilateral] : knee bilaterally [advanced tricompartmental OA with medial compartment narrowing and varus alignment] : advanced tricompartmental OA with medial compartment narrowing and varus alignment [] : no erythema [[id number]] : flexion 115 degrees [de-identified] : extension 0 degrees

## 2024-05-01 NOTE — DISCUSSION/SUMMARY
[de-identified] : The patient was advised of the diagnosis.  The natural history of the pathology was explained in full to the patient in layman's terms. All questions were answered.  The risks and benefits of surgical and non-surgical treatment alternatives were explained in full to the patient.  The natural progression of Osteoarthritis was explained to the patient.  We discussed the possible treatment options from conservative to operative.  These included NSAIDS, Glucosamine and Chondrotin sulfate, and Physical Therapy as well different types of injections.  We also discussed that at some point they may progress to needed a TKA.  Information and pamphlets were given.  The risks, benefits, contents and alternatives to injection were explained in full to the patient.  Risks outlined include but are not limited to infection, sepsis, bleeding, scarring, skin discoloration, temporary increase in pain, syncopal episode, failure to resolve symptoms, allergic reaction, flare reaction, permanent white skin discoloration, symptom recurrence, and elevation of blood sugar in diabetics.  Patient understood the risks.  All questions were answered.  After discussion of options, patient requested an injection.  Oral informed consent was obtained and sterile prep was done of the injection site.  Sterile technique was used to introduce the mixture.  Patient tolerated the procedure well.  Patient advised to ice the injection site this evening.  Signs and symptoms of infection reviewed and patient advised to call immediately for redness, fevers, and/or chills.   Progress note completed by Alida Donato PA-C

## 2024-05-23 ENCOUNTER — APPOINTMENT (OUTPATIENT)
Dept: GASTROENTEROLOGY | Facility: CLINIC | Age: 74
End: 2024-05-23
Payer: MEDICARE

## 2024-05-23 VITALS
BODY MASS INDEX: 29.19 KG/M2 | SYSTOLIC BLOOD PRESSURE: 126 MMHG | WEIGHT: 186 LBS | HEIGHT: 67 IN | DIASTOLIC BLOOD PRESSURE: 75 MMHG | HEART RATE: 82 BPM

## 2024-05-23 DIAGNOSIS — K52.82 EOSINOPHILIC COLITIS: ICD-10-CM

## 2024-05-23 DIAGNOSIS — Z80.0 FAMILY HISTORY OF MALIGNANT NEOPLASM OF DIGESTIVE ORGANS: ICD-10-CM

## 2024-05-23 DIAGNOSIS — Z86.010 ENCOUNTER FOR SCREENING FOR MALIGNANT NEOPLASM OF COLON: ICD-10-CM

## 2024-05-23 DIAGNOSIS — I10 ESSENTIAL (PRIMARY) HYPERTENSION: ICD-10-CM

## 2024-05-23 DIAGNOSIS — K64.8 OTHER HEMORRHOIDS: ICD-10-CM

## 2024-05-23 DIAGNOSIS — E78.00 PURE HYPERCHOLESTEROLEMIA, UNSPECIFIED: ICD-10-CM

## 2024-05-23 DIAGNOSIS — E11.9 TYPE 2 DIABETES MELLITUS W/OUT COMPLICATIONS: ICD-10-CM

## 2024-05-23 DIAGNOSIS — Z12.11 ENCOUNTER FOR SCREENING FOR MALIGNANT NEOPLASM OF COLON: ICD-10-CM

## 2024-05-23 PROCEDURE — 99203 OFFICE O/P NEW LOW 30 MIN: CPT

## 2024-05-23 RX ORDER — EMPAGLIFLOZIN 25 MG/1
25 TABLET, FILM COATED ORAL
Refills: 0 | Status: ACTIVE | COMMUNITY

## 2024-05-23 RX ORDER — ASPIRIN 81 MG/1
81 TABLET, CHEWABLE ORAL
Refills: 0 | Status: ACTIVE | COMMUNITY

## 2024-05-23 RX ORDER — DULAGLUTIDE 3 MG/.5ML
3 INJECTION, SOLUTION SUBCUTANEOUS
Refills: 0 | Status: ACTIVE | COMMUNITY

## 2024-05-23 RX ORDER — POLYETHYLENE GLYCOL 3350 AND ELECTROLYTES WITH LEMON FLAVOR 236; 22.74; 6.74; 5.86; 2.97 G/4L; G/4L; G/4L; G/4L; G/4L
236 POWDER, FOR SOLUTION ORAL
Qty: 1 | Refills: 0 | Status: ACTIVE | COMMUNITY
Start: 2024-05-23 | End: 1900-01-01

## 2024-05-23 RX ORDER — INSULIN LISPRO 100 [IU]/ML
INJECTION, SOLUTION INTRAVENOUS; SUBCUTANEOUS
Refills: 0 | Status: DISCONTINUED | COMMUNITY
End: 2024-05-23

## 2024-05-23 RX ORDER — INSULIN ASPART 100 [IU]/ML
(70-30) 100 INJECTION, SUSPENSION SUBCUTANEOUS
Refills: 0 | Status: ACTIVE | COMMUNITY

## 2024-05-23 RX ORDER — METFORMIN HYDROCHLORIDE 625 MG/1
TABLET ORAL
Refills: 0 | Status: ACTIVE | COMMUNITY

## 2024-05-23 NOTE — REASON FOR VISIT
[Consultation] : a consultation visit [Family Member] : family member [Other: _____] : [unfilled] [FreeTextEntry1] : surveillance colonoscopy

## 2024-05-23 NOTE — PHYSICAL EXAM
[Alert] : alert [No Acute Distress] : no acute distress [Sclera] : the sclera and conjunctiva were normal [Hearing Threshold Finger Rub Not Graves] : hearing was normal [Normal Appearance] : the appearance of the neck was normal [No Respiratory Distress] : no respiratory distress [Auscultation Breath Sounds / Voice Sounds] : lungs were clear to auscultation bilaterally [Heart Rate And Rhythm] : heart rate was normal and rhythm regular [Normal S1, S2] : normal S1 and S2 [None] : no edema [Bowel Sounds] : normal bowel sounds [Abdomen Tenderness] : non-tender [No Masses] : no abdominal mass palpated [Abdomen Soft] : soft [Supraclavicular Lymph Nodes Enlarged Bilaterally] : no supraclavicular lymphadenopathy [Normal Color / Pigmentation] : normal skin color and pigmentation [No Focal Deficits] : no focal deficits [Oriented To Time, Place, And Person] : oriented to person, place, and time

## 2024-05-23 NOTE — ASSESSMENT
[FreeTextEntry1] : 1.  Encounter for surveillance colonoscopy.  Family history of colon cancer (father).  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 2.  Constipation. History of bleeding hemorrhoids s/p banding. 3.  IDDM. 4.  HTN.  Recs: - Recent labs and prior colonoscopy reviewed. - Patient was advised to undergo colonoscopy- procedure, risks, benefits, and alternatives were explained. Patient agreeable. Brochure given. PEG prep.  Hold Jardiance for 3 days and Trulicity for 7 days prior to procedure.

## 2024-07-09 ENCOUNTER — APPOINTMENT (OUTPATIENT)
Dept: GASTROENTEROLOGY | Facility: CLINIC | Age: 74
End: 2024-07-09
Payer: MEDICARE

## 2024-07-09 ENCOUNTER — LABORATORY RESULT (OUTPATIENT)
Age: 74
End: 2024-07-09

## 2024-07-09 PROCEDURE — 45380 COLONOSCOPY AND BIOPSY: CPT

## 2024-07-24 ENCOUNTER — NON-APPOINTMENT (OUTPATIENT)
Age: 74
End: 2024-07-24

## 2024-12-24 PROBLEM — F10.90 ALCOHOL USE: Status: ACTIVE | Noted: 2020-02-24

## 2025-05-28 ENCOUNTER — APPOINTMENT (OUTPATIENT)
Dept: ORTHOPEDIC SURGERY | Facility: CLINIC | Age: 75
End: 2025-05-28
Payer: MEDICARE

## 2025-05-28 DIAGNOSIS — M17.12 UNILATERAL PRIMARY OSTEOARTHRITIS, LEFT KNEE: ICD-10-CM

## 2025-05-28 DIAGNOSIS — M17.11 UNILATERAL PRIMARY OSTEOARTHRITIS, RIGHT KNEE: ICD-10-CM

## 2025-05-28 PROCEDURE — 99214 OFFICE O/P EST MOD 30 MIN: CPT

## 2025-05-28 PROCEDURE — 73564 X-RAY EXAM KNEE 4 OR MORE: CPT | Mod: 50

## 2025-07-16 ENCOUNTER — APPOINTMENT (OUTPATIENT)
Dept: ORTHOPEDIC SURGERY | Facility: CLINIC | Age: 75
End: 2025-07-16
Payer: MEDICARE

## 2025-07-16 PROCEDURE — 73564 X-RAY EXAM KNEE 4 OR MORE: CPT | Mod: 50

## 2025-07-16 PROCEDURE — 99215 OFFICE O/P EST HI 40 MIN: CPT

## 2025-08-20 ENCOUNTER — APPOINTMENT (OUTPATIENT)
Dept: CT IMAGING | Facility: CLINIC | Age: 75
End: 2025-08-20

## 2025-09-04 ENCOUNTER — APPOINTMENT (OUTPATIENT)
Dept: CT IMAGING | Facility: CLINIC | Age: 75
End: 2025-09-04
Payer: MEDICARE

## 2025-09-04 PROCEDURE — 73700 CT LOWER EXTREMITY W/O DYE: CPT | Mod: LT

## 2025-09-19 ENCOUNTER — NON-APPOINTMENT (OUTPATIENT)
Age: 75
End: 2025-09-19